# Patient Record
Sex: MALE | Race: WHITE | Employment: FULL TIME | URBAN - NONMETROPOLITAN AREA
[De-identification: names, ages, dates, MRNs, and addresses within clinical notes are randomized per-mention and may not be internally consistent; named-entity substitution may affect disease eponyms.]

---

## 2019-12-30 ENCOUNTER — HOSPITAL ENCOUNTER (OUTPATIENT)
Dept: NON INVASIVE DIAGNOSTICS | Age: 59
Discharge: HOME | End: 2019-12-30
Payer: COMMERCIAL

## 2019-12-30 LAB
LV EF: 50 %
LVEF MODALITY: NORMAL

## 2019-12-30 PROCEDURE — 93017 CV STRESS TEST TRACING ONLY: CPT

## 2022-10-14 ENCOUNTER — HOSPITAL ENCOUNTER (EMERGENCY)
Age: 62
Discharge: HOME | End: 2022-10-14
Payer: COMMERCIAL

## 2022-10-14 ENCOUNTER — APPOINTMENT (OUTPATIENT)
Dept: GENERAL RADIOLOGY | Age: 62
End: 2022-10-14
Payer: COMMERCIAL

## 2022-10-14 VITALS
OXYGEN SATURATION: 99 % | DIASTOLIC BLOOD PRESSURE: 84 MMHG | SYSTOLIC BLOOD PRESSURE: 140 MMHG | HEART RATE: 78 BPM | RESPIRATION RATE: 16 BRPM | TEMPERATURE: 98.1 F

## 2022-10-14 DIAGNOSIS — I10 PRIMARY HYPERTENSION: Primary | ICD-10-CM

## 2022-10-14 LAB
ALBUMIN SERPL-MCNC: 5.2 G/DL (ref 3.5–5.2)
ALP BLD-CCNC: 69 U/L (ref 40–130)
ALT SERPL-CCNC: 20 U/L (ref 5–41)
ANION GAP SERPL CALCULATED.3IONS-SCNC: 13 MMOL/L (ref 7–19)
AST SERPL-CCNC: 28 U/L (ref 5–40)
BASOPHILS ABSOLUTE: 0 K/UL (ref 0–0.2)
BASOPHILS RELATIVE PERCENT: 0.4 % (ref 0–1)
BILIRUB SERPL-MCNC: 0.4 MG/DL (ref 0.2–1.2)
BUN BLDV-MCNC: 10 MG/DL (ref 8–23)
CALCIUM SERPL-MCNC: 9.7 MG/DL (ref 8.8–10.2)
CHLORIDE BLD-SCNC: 97 MMOL/L (ref 98–111)
CO2: 25 MMOL/L (ref 22–29)
CREAT SERPL-MCNC: 0.9 MG/DL (ref 0.5–1.2)
EKG P AXIS: 17 DEGREES
EKG P-R INTERVAL: 174 MS
EKG Q-T INTERVAL: 378 MS
EKG QRS DURATION: 110 MS
EKG QTC CALCULATION (BAZETT): 407 MS
EKG T AXIS: 31 DEGREES
EOSINOPHILS ABSOLUTE: 0 K/UL (ref 0–0.6)
EOSINOPHILS RELATIVE PERCENT: 0 % (ref 0–5)
GFR AFRICAN AMERICAN: >59
GFR NON-AFRICAN AMERICAN: >60
GLUCOSE BLD-MCNC: 149 MG/DL (ref 74–109)
HCT VFR BLD CALC: 44.1 % (ref 42–52)
HEMOGLOBIN: 15.7 G/DL (ref 14–18)
IMMATURE GRANULOCYTES #: 0 K/UL
LYMPHOCYTES ABSOLUTE: 0.8 K/UL (ref 1.1–4.5)
LYMPHOCYTES RELATIVE PERCENT: 16.6 % (ref 20–40)
MAGNESIUM: 2.3 MG/DL (ref 1.6–2.4)
MCH RBC QN AUTO: 32.8 PG (ref 27–31)
MCHC RBC AUTO-ENTMCNC: 35.6 G/DL (ref 33–37)
MCV RBC AUTO: 92.1 FL (ref 80–94)
MONOCYTES ABSOLUTE: 1.2 K/UL (ref 0–0.9)
MONOCYTES RELATIVE PERCENT: 25.3 % (ref 0–10)
NEUTROPHILS ABSOLUTE: 2.8 K/UL (ref 1.5–7.5)
NEUTROPHILS RELATIVE PERCENT: 57.3 % (ref 50–65)
PDW BLD-RTO: 12.1 % (ref 11.5–14.5)
PLATELET # BLD: 202 K/UL (ref 130–400)
PMV BLD AUTO: 9.8 FL (ref 9.4–12.4)
POTASSIUM REFLEX MAGNESIUM: 3.5 MMOL/L (ref 3.5–5)
RBC # BLD: 4.79 M/UL (ref 4.7–6.1)
SODIUM BLD-SCNC: 135 MMOL/L (ref 136–145)
TOTAL PROTEIN: 7.4 G/DL (ref 6.6–8.7)
TROPONIN: <0.01 NG/ML (ref 0–0.03)
WBC # BLD: 4.9 K/UL (ref 4.8–10.8)

## 2022-10-14 PROCEDURE — 99285 EMERGENCY DEPT VISIT HI MDM: CPT | Performed by: EMERGENCY MEDICINE

## 2022-10-14 PROCEDURE — 84484 ASSAY OF TROPONIN QUANT: CPT

## 2022-10-14 PROCEDURE — 36415 COLL VENOUS BLD VENIPUNCTURE: CPT

## 2022-10-14 PROCEDURE — 71045 X-RAY EXAM CHEST 1 VIEW: CPT | Performed by: RADIOLOGY

## 2022-10-14 PROCEDURE — 80053 COMPREHEN METABOLIC PANEL: CPT

## 2022-10-14 PROCEDURE — 83735 ASSAY OF MAGNESIUM: CPT

## 2022-10-14 PROCEDURE — 71045 X-RAY EXAM CHEST 1 VIEW: CPT

## 2022-10-14 PROCEDURE — 85025 COMPLETE CBC W/AUTO DIFF WBC: CPT

## 2022-10-14 PROCEDURE — 93005 ELECTROCARDIOGRAM TRACING: CPT | Performed by: NURSE PRACTITIONER

## 2022-10-14 ASSESSMENT — ENCOUNTER SYMPTOMS
SORE THROAT: 0
SHORTNESS OF BREATH: 0
FACIAL SWELLING: 0
COUGH: 0

## 2022-10-14 NOTE — ED PROVIDER NOTES
[facility] - [facility] EMERGENCY DEPT  eMERGENCY dEPARTMENT eNCOUnter      Pt Name: Tameka Mckay  MRN: [id number]  [place] 1960  Date of evaluation: 10/14/2022  Provider: APOLLO Barker    CHIEF COMPLAINT       Chief Complaint   Patient presents with    Dental Pain    Hypertension         HISTORY OF PRESENT ILLNESS   (Location/Symptom, Timing/Onset,Context/Setting, Quality, Duration, Modifying Factors, Severity)  Note limiting factors. Tameka Mckay is a 63 y.o. male who presents to the emergency department with all his teeth hurting, sinus congestion and an elevated blood pressure. Pt took his blood pressure meds this am.  [place] No cardiac problems previously. No chest pain but had an episode of heartburn yesterday that was unusual.  Cleared with an antacid. The history is provided by the patient. Hypertension  Onset quality:  Sudden  Associated symptoms: ear pain    Associated symptoms: no chest pain, no fever and no shortness of breath      NursingNotes were reviewed. REVIEW OF SYSTEMS    (2-9 systems for level 4, 10 or more for level 5)     Review of Systems   Constitutional:  Negative for fever. HENT:  Positive for congestion and ear pain. Negative for facial swelling and sore throat. Respiratory:  Negative for cough and shortness of breath. Cardiovascular:  Negative for chest pain. Except as noted above the remainder of the review of systems was reviewed and negative. PAST MEDICAL HISTORY   No past medical history on file. SURGICALHISTORY     No past surgical history on file. CURRENT MEDICATIONS       Previous Medications    No medications on file       ALLERGIES     Patient has no known allergies. FAMILY HISTORY     No family history on file.        SOCIAL HISTORY       Social History     Socioeconomic History    Marital status:        SCREENINGS    [place] Coma Scale  Eye Opening: Spontaneous  Best Verbal Response: Oriented  Best Motor Response: Obeys commands  [place]o Coma Scale Score: 15 @FLOW([id number])@      PHYSICAL EXAM    (up to 7 for level 4, 8 or more for level 5)     ED Triage Vitals [10/14/22 1209]   BP Temp Temp src Heart Rate Resp SpO2 Height Weight   (!) 151/102 98.1 °F (36.7 °C) -- 97 17 99 % -- --       Physical Exam  Vitals and nursing note reviewed. Constitutional:       Appearance: Normal appearance. He is well-developed. He is obese. HENT:      Head: Normocephalic and atraumatic. Eyes:      General: No scleral icterus. Right eye: No discharge. Left eye: No discharge. Cardiovascular:      Rate and Rhythm: Normal rate and regular rhythm. Heart sounds: Normal heart sounds. No murmur heard. Pulmonary:      Effort: Pulmonary effort is normal. No respiratory distress. Breath sounds: Normal breath sounds. Musculoskeletal:      Cervical back: Normal range of motion and neck supple. Neurological:      Mental Status: He is alert and oriented to person, place, and time. Psychiatric:         Behavior: Behavior normal.       DIAGNOSTIC RESULTS     EKG: All EKG's are interpreted by the Emergency Department Physician who either signs or Co-signsthis chart in the absence of a cardiologist.  78 sinus rhythm nonspecific changes read at 1555 by Dr. Pennie Carty:   Therese Heading such as CT, Ultrasound and MRI are read by the radiologist. Plain radiographic images are visualized and preliminarily interpreted by the emergency physician with the below findings:      Interpretation per the Radiologist below, if available at the time of this note:    XR CHEST PORTABLE   Final Result   No gross infiltrate   Recommendation: Follow up as clinically indicated.     Electronically Signed by Tasha Kinsey MD at 14-Oct-2022 06:06:13 PM                     ED BEDSIDEULTRASOUND:   Performed by ED Physician -none    LABS:  Labs Reviewed   CBC WITH AUTO DIFFERENTIAL - Abnormal; Notable for the following components:       Result Value    MCH 32.8 (*)     Lymphocytes % 16.6 (*)     Monocytes % 25.3 (*)     Lymphocytes Absolute 0.8 (*)     Monocytes Absolute 1.20 (*)     All other components within normal limits   COMPREHENSIVE METABOLIC PANEL W/ REFLEX TO MG FOR LOW K - Abnormal; Notable for the following components:    Sodium 135 (*)     Chloride 97 (*)     Glucose 149 (*)     All other components within normal limits   TROPONIN   MAGNESIUM       All other labs were within normal range or not returned as of this dictation. EMERGENCY DEPARTMENT COURSE and DIFFERENTIALDIAGNOSIS/MDM:   Vitals:    Vitals:    10/14/22 1209 10/14/22 1537   BP: (!) 151/102 134/86   Pulse: 97 80   Resp: 17    Temp: 98.1 °F (36.7 °C)    SpO2: 99%            MDM  Blood pressures have been normal since being placed in a room in the ER. [place] [employer]. Counseled about his medications and keeping a log of his blood pressures. Labs and EKG are all nonconcerning we will discharge him back to work      CONSULTS:  None    PROCEDURES:  Unless otherwise noted below, none     Procedures    FINAL IMPRESSION      1. Primary hypertension        DISPOSITION/PLAN   DISPOSITION        PATIENT REFERRED TO:  No follow-up provider specified.     DISCHARGE MEDICATIONS:  New Prescriptions    No medications on file          (Please note that portions of this note were completed with a voice recognitionprogram.  Efforts were made to edit the dictations but occasionally words are mis-transcribed.)    APOLLO Lou (electronically signed)          APOLLO Lou  10/14/22 0912

## 2025-04-29 ENCOUNTER — HOSPITAL ENCOUNTER
Age: 65
End: 2025-04-29
Payer: COMMERCIAL

## 2025-04-29 DIAGNOSIS — Z79.899: ICD-10-CM

## 2025-04-29 DIAGNOSIS — M25.50: Primary | ICD-10-CM

## 2025-04-29 PROCEDURE — 86235 NUCLEAR ANTIGEN ANTIBODY: CPT

## 2025-04-29 PROCEDURE — 86160 COMPLEMENT ANTIGEN: CPT

## 2025-04-29 PROCEDURE — 82565 ASSAY OF CREATININE: CPT

## 2025-04-29 PROCEDURE — 86803 HEPATITIS C AB TEST: CPT

## 2025-04-29 PROCEDURE — 86704 HEP B CORE ANTIBODY TOTAL: CPT

## 2025-04-29 PROCEDURE — 86255 FLUORESCENT ANTIBODY SCREEN: CPT

## 2025-04-29 PROCEDURE — 86480 TB TEST CELL IMMUN MEASURE: CPT

## 2025-04-29 PROCEDURE — 86376 MICROSOMAL ANTIBODY EACH: CPT

## 2025-04-29 PROCEDURE — 82306 VITAMIN D 25 HYDROXY: CPT

## 2025-04-29 PROCEDURE — 86162 COMPLEMENT TOTAL (CH50): CPT

## 2025-04-29 PROCEDURE — 80076 HEPATIC FUNCTION PANEL: CPT

## 2025-04-29 PROCEDURE — 85651 RBC SED RATE NONAUTOMATED: CPT

## 2025-04-29 PROCEDURE — 83520 IMMUNOASSAY QUANT NOS NONAB: CPT

## 2025-04-29 PROCEDURE — 36415 COLL VENOUS BLD VENIPUNCTURE: CPT

## 2025-04-29 PROCEDURE — 87340 HEPATITIS B SURFACE AG IA: CPT

## 2025-04-29 PROCEDURE — 86140 C-REACTIVE PROTEIN: CPT

## 2025-04-29 PROCEDURE — 85025 COMPLETE CBC W/AUTO DIFF WBC: CPT

## 2025-05-29 ENCOUNTER — HOSPITAL ENCOUNTER (OUTPATIENT)
Age: 65
Discharge: HOME | End: 2025-05-29
Payer: COMMERCIAL

## 2025-05-29 DIAGNOSIS — Z79.899: Primary | ICD-10-CM

## 2025-05-29 LAB
ALANINE AMINOTRANSFERASE: 18 U/L (ref 0–41)
ALBUMIN LEVEL: 4.4 G/DL (ref 3.5–5.2)
ALKALINE PHOSPHATASE: 68 U/L (ref 40–130)
ASPARTATE AMINO TRANSFERASE: 22 U/L (ref 0–40)
GLOBULIN: 2.2 G/DL (ref 1.3–4.6)
HEMATOCRIT: 39.8 % (ref 37–53)
MCH RBC QN AUTO: 31.8 PG (ref 27–33)
MCV RBC: 89.6 FL (ref 82–101)
MEAN CORPUSCULAR HGB CONC: 35.4 G/DL (ref 30–55)
NUCLEATED RED BLOOD CELLS %: 0 %
PLATELET # BLD: 223 10^3/CMM (ref 157–399)
RBC # BLD AUTO: 4.44 10^6/UL (ref 3.85–5.65)
TOTAL PROTEIN: 6.6 G/DL (ref 6.6–8.7)
WBC # BLD: 5.08 10^3/UL (ref 3.29–11.43)

## 2025-05-29 PROCEDURE — 36415 COLL VENOUS BLD VENIPUNCTURE: CPT

## 2025-05-29 PROCEDURE — 85025 COMPLETE CBC W/AUTO DIFF WBC: CPT

## 2025-05-29 PROCEDURE — 85651 RBC SED RATE NONAUTOMATED: CPT

## 2025-05-29 PROCEDURE — 86140 C-REACTIVE PROTEIN: CPT

## 2025-05-29 PROCEDURE — 80076 HEPATIC FUNCTION PANEL: CPT

## 2025-05-29 PROCEDURE — 82565 ASSAY OF CREATININE: CPT
